# Patient Record
Sex: MALE | Race: WHITE | Employment: FULL TIME | URBAN - METROPOLITAN AREA
[De-identification: names, ages, dates, MRNs, and addresses within clinical notes are randomized per-mention and may not be internally consistent; named-entity substitution may affect disease eponyms.]

---

## 2017-06-27 RX ORDER — AMLODIPINE AND BENAZEPRIL HYDROCHLORIDE 5; 20 MG/1; MG/1
CAPSULE ORAL
Qty: 30 CAP | Refills: 2 | Status: SHIPPED | OUTPATIENT
Start: 2017-06-27

## 2018-09-11 ENCOUNTER — ANESTHESIA EVENT (OUTPATIENT)
Dept: ENDOSCOPY | Age: 58
End: 2018-09-11
Payer: COMMERCIAL

## 2018-09-11 NOTE — ANESTHESIA PREPROCEDURE EVALUATION
Anesthetic History No history of anesthetic complications Review of Systems / Medical History Patient summary reviewed, nursing notes reviewed and pertinent labs reviewed Pulmonary Smoker Neuro/Psych Within defined limits Cardiovascular Hypertension GI/Hepatic/Renal 
Within defined limits Endo/Other Within defined limits Comments: gout Other Findings Physical Exam 
 
Airway Mallampati: II 
TM Distance: > 6 cm Neck ROM: normal range of motion Mouth opening: Normal 
 
 Cardiovascular Regular rate and rhythm,  S1 and S2 normal,  no murmur, click, rub, or gallop Dental 
No notable dental hx Pulmonary Breath sounds clear to auscultation Abdominal 
GI exam deferred Other Findings Anesthetic Plan ASA: 2 Anesthesia type: MAC Induction: Intravenous Anesthetic plan and risks discussed with: Patient

## 2018-09-12 ENCOUNTER — ANESTHESIA (OUTPATIENT)
Dept: ENDOSCOPY | Age: 58
End: 2018-09-12
Payer: COMMERCIAL

## 2018-09-12 ENCOUNTER — HOSPITAL ENCOUNTER (OUTPATIENT)
Age: 58
Setting detail: OUTPATIENT SURGERY
Discharge: HOME | End: 2018-09-12
Attending: COLON & RECTAL SURGERY | Admitting: COLON & RECTAL SURGERY
Payer: COMMERCIAL

## 2018-09-12 VITALS
HEART RATE: 85 BPM | BODY MASS INDEX: 24.59 KG/M2 | DIASTOLIC BLOOD PRESSURE: 82 MMHG | OXYGEN SATURATION: 100 % | HEIGHT: 69 IN | RESPIRATION RATE: 19 BRPM | WEIGHT: 166 LBS | TEMPERATURE: 97.7 F | SYSTOLIC BLOOD PRESSURE: 144 MMHG

## 2018-09-12 PROCEDURE — 88305 TISSUE EXAM BY PATHOLOGIST: CPT | Performed by: COLON & RECTAL SURGERY

## 2018-09-12 PROCEDURE — 74011250636 HC RX REV CODE- 250/636

## 2018-09-12 PROCEDURE — 77030027957 HC TBNG IRR ENDOGTR BUSS -B: Performed by: COLON & RECTAL SURGERY

## 2018-09-12 PROCEDURE — 77030009426 HC FCPS BIOP ENDOSC BSC -B: Performed by: COLON & RECTAL SURGERY

## 2018-09-12 PROCEDURE — 77030013992 HC SNR POLYP ENDOSC BSC -B: Performed by: COLON & RECTAL SURGERY

## 2018-09-12 PROCEDURE — 76040000007: Performed by: COLON & RECTAL SURGERY

## 2018-09-12 PROCEDURE — 76060000032 HC ANESTHESIA 0.5 TO 1 HR: Performed by: COLON & RECTAL SURGERY

## 2018-09-12 RX ORDER — FLUMAZENIL 0.1 MG/ML
0.2 INJECTION INTRAVENOUS
Status: DISCONTINUED | OUTPATIENT
Start: 2018-09-12 | End: 2018-09-12 | Stop reason: HOSPADM

## 2018-09-12 RX ORDER — FENTANYL CITRATE 50 UG/ML
12.5-5 INJECTION, SOLUTION INTRAMUSCULAR; INTRAVENOUS
Status: DISCONTINUED | OUTPATIENT
Start: 2018-09-12 | End: 2018-09-12 | Stop reason: HOSPADM

## 2018-09-12 RX ORDER — NALOXONE HYDROCHLORIDE 0.4 MG/ML
0.4 INJECTION, SOLUTION INTRAMUSCULAR; INTRAVENOUS; SUBCUTANEOUS
Status: DISCONTINUED | OUTPATIENT
Start: 2018-09-12 | End: 2018-09-12 | Stop reason: HOSPADM

## 2018-09-12 RX ORDER — SODIUM CHLORIDE 0.9 % (FLUSH) 0.9 %
5-10 SYRINGE (ML) INJECTION AS NEEDED
Status: DISCONTINUED | OUTPATIENT
Start: 2018-09-12 | End: 2018-09-12 | Stop reason: HOSPADM

## 2018-09-12 RX ORDER — SODIUM CHLORIDE 9 MG/ML
100 INJECTION, SOLUTION INTRAVENOUS CONTINUOUS
Status: DISCONTINUED | OUTPATIENT
Start: 2018-09-12 | End: 2018-09-12 | Stop reason: HOSPADM

## 2018-09-12 RX ORDER — PROPOFOL 10 MG/ML
INJECTION, EMULSION INTRAVENOUS AS NEEDED
Status: DISCONTINUED | OUTPATIENT
Start: 2018-09-12 | End: 2018-09-12 | Stop reason: HOSPADM

## 2018-09-12 RX ORDER — LIDOCAINE HYDROCHLORIDE 20 MG/ML
INJECTION, SOLUTION EPIDURAL; INFILTRATION; INTRACAUDAL; PERINEURAL AS NEEDED
Status: DISCONTINUED | OUTPATIENT
Start: 2018-09-12 | End: 2018-09-12 | Stop reason: HOSPADM

## 2018-09-12 RX ORDER — DEXTROMETHORPHAN/PSEUDOEPHED 2.5-7.5/.8
1.2 DROPS ORAL
Status: DISCONTINUED | OUTPATIENT
Start: 2018-09-12 | End: 2018-09-12 | Stop reason: HOSPADM

## 2018-09-12 RX ORDER — MIDAZOLAM HYDROCHLORIDE 1 MG/ML
.25-5 INJECTION, SOLUTION INTRAMUSCULAR; INTRAVENOUS
Status: DISCONTINUED | OUTPATIENT
Start: 2018-09-12 | End: 2018-09-12 | Stop reason: HOSPADM

## 2018-09-12 RX ORDER — EPINEPHRINE 0.1 MG/ML
1 INJECTION INTRACARDIAC; INTRAVENOUS
Status: DISCONTINUED | OUTPATIENT
Start: 2018-09-12 | End: 2018-09-12 | Stop reason: HOSPADM

## 2018-09-12 RX ORDER — SODIUM CHLORIDE 0.9 % (FLUSH) 0.9 %
5-10 SYRINGE (ML) INJECTION EVERY 8 HOURS
Status: DISCONTINUED | OUTPATIENT
Start: 2018-09-12 | End: 2018-09-12 | Stop reason: HOSPADM

## 2018-09-12 RX ORDER — SODIUM CHLORIDE 9 MG/ML
INJECTION, SOLUTION INTRAVENOUS
Status: DISCONTINUED | OUTPATIENT
Start: 2018-09-12 | End: 2018-09-12 | Stop reason: HOSPADM

## 2018-09-12 RX ORDER — ATROPINE SULFATE 0.1 MG/ML
0.5 INJECTION INTRAVENOUS
Status: DISCONTINUED | OUTPATIENT
Start: 2018-09-12 | End: 2018-09-12 | Stop reason: HOSPADM

## 2018-09-12 RX ADMIN — PROPOFOL 50 MG: 10 INJECTION, EMULSION INTRAVENOUS at 09:37

## 2018-09-12 RX ADMIN — LIDOCAINE HYDROCHLORIDE 80 MG: 20 INJECTION, SOLUTION EPIDURAL; INFILTRATION; INTRACAUDAL; PERINEURAL at 09:14

## 2018-09-12 RX ADMIN — PROPOFOL 50 MG: 10 INJECTION, EMULSION INTRAVENOUS at 09:25

## 2018-09-12 RX ADMIN — PROPOFOL 50 MG: 10 INJECTION, EMULSION INTRAVENOUS at 09:14

## 2018-09-12 RX ADMIN — PROPOFOL 50 MG: 10 INJECTION, EMULSION INTRAVENOUS at 09:20

## 2018-09-12 RX ADMIN — PROPOFOL 50 MG: 10 INJECTION, EMULSION INTRAVENOUS at 09:29

## 2018-09-12 RX ADMIN — PROPOFOL 50 MG: 10 INJECTION, EMULSION INTRAVENOUS at 09:40

## 2018-09-12 RX ADMIN — PROPOFOL 50 MG: 10 INJECTION, EMULSION INTRAVENOUS at 09:23

## 2018-09-12 RX ADMIN — PROPOFOL 50 MG: 10 INJECTION, EMULSION INTRAVENOUS at 09:16

## 2018-09-12 RX ADMIN — SODIUM CHLORIDE: 9 INJECTION, SOLUTION INTRAVENOUS at 09:11

## 2018-09-12 RX ADMIN — PROPOFOL 50 MG: 10 INJECTION, EMULSION INTRAVENOUS at 09:34

## 2018-09-12 RX ADMIN — PROPOFOL 50 MG: 10 INJECTION, EMULSION INTRAVENOUS at 09:15

## 2018-09-12 NOTE — ANESTHESIA POSTPROCEDURE EVALUATION
Post-Anesthesia Evaluation and Assessment Patient: Kee Joshi MRN: [id number]  SSN: [id number] YOB: 1960  Age: 59 y.o. Sex: male Cardiovascular Function/Vital Signs Visit Vitals  /82  Pulse 85  Temp 36.5 °C (97.7 °F)  Resp 19  
 Ht 5' 9\" (1.753 m)  Wt 75.3 kg (166 lb)  SpO2 100%  BMI 24.51 kg/m2 Patient is status post MAC anesthesia for Procedure(s): 
COLONOSCOPY 
ENDOSCOPIC POLYPECTOMY. Nausea/Vomiting: None Postoperative hydration reviewed and adequate. Pain: 
Pain Scale 1: Numeric (0 - 10) (09/12/18 1009) Pain Intensity 1: 0 (09/12/18 1009) Managed Neurological Status: At baseline Mental Status and Level of Consciousness: Arousable Pulmonary Status:  
O2 Device: Room air (09/12/18 1009) Adequate oxygenation and airway patent Complications related to anesthesia: None Post-anesthesia assessment completed. No concerns Signed By: Mariana Rabago MD   
 September 12, 2018

## 2018-09-12 NOTE — H&P
History and Physical (outpatient) Patient: Sushila Leonard 59 y.o. male Chief Complaint: Need for colorectal cancer screening. History of Present Illness: The patient is an asymptomatic 77-year-old male who had one adenomatous polyp excised colonoscopically on 10/24/2008 and two tubular adenomas excised colonoscopically on 10/4/2013. History: 
Past Medical History:  
Diagnosis Date  Diverticulosis of colon  Gout  HDL lipoprotein deficiency  Hyperglycemia  Hypertension  Personal history of colonic polyps One diminutive adenoma was excised colonoscopically on 10/24/2008. Two tubular adenomas were excised colonoscopically on 10/4/2013. Past Surgical History:  
Procedure Laterality Date  HX COLONOSCOPY  10/04/2013 Diverticulosis. At least two tubular adenomas removed. Dr. Mortimer Capes.  HX COLONOSCOPY  10/24/2008 Diverticulosis. Adenomatous polyp removed. Dr. Mortimer Capes. [facility]; Neil Leblanc MD.  
[place] Pa HX TONSILLECTOMY  HX WISDOM TEETH EXTRACTION Family History Problem Relation Age of Onset  Cancer Mother  Cancer Father Social History Social History  Marital status: SINGLE Spouse name: N/A  
 Number of children: N/A  
 Years of education: N/A Occupational History  Not on file. Social History Main Topics  Smoking status: Never Smoker  Smokeless tobacco: Never Used  Alcohol use 0.0 oz/week  
  0 Standard drinks or equivalent per week  Drug use: No  
 Sexual activity: Yes  
  Partners: Female Other Topics Concern  Not on file Social History Narrative Allergies: No Known Allergies Current Medications: 
Cannot display prior to admission medications because the patient has not been admitted in this contact. No current facility-administered medications for this encounter. Current Outpatient Prescriptions Medication Sig  
  ASPIRIN PO Take 81 mg by mouth daily.  amLODIPine-benazepril (LOTREL) 5-20 mg per capsule TAKE 1 CAPSULE BY MOUTH DAILY.  sildenafil (REVATIO) 20 mg tablet TAKE 3 TO 5 TABLETS AS NEEDED  cyclobenzaprine (FLEXERIL) 10 mg tablet Take 1 Tab by mouth three (3) times daily as needed for Muscle Spasm(s).  OMEGA-3 FATTY ACIDS/FISH OIL (OMEGA 3 FISH OIL PO) Take  by mouth.  KRILL OIL PO Take  by mouth.  varenicline (CHANTIX STARTER KAIDEN) Per Dose pack instructions  colchicine 0.6 mg tablet Take 1 Tab by mouth two (2) times daily as needed.  indomethacin (INDOCIN) 50 mg capsule Take 1 Cap by mouth three (3) times daily as needed.  amLODIPine-benazepril (LOTREL) 5-20 mg per capsule TAKE 1 CAPSULE BY MOUTH DAILY.  cranberry 400 mg cap Take  by mouth.  aspirin 81 mg chewable tablet Take 81 mg by mouth daily.  LACTOBACILLUS ACIDOPHILUS (PROBIOTIC PO) Take  by mouth.  Cholecalciferol, Vitamin D3, (VITAMIN D3) 1,000 unit Cap Take  by mouth.  omega-3 fatty acids Cap Take  by mouth.  PV W-O AMIRA/FERROUS FUMARATE/FA (M-VIT PO) Take  by mouth. Physical Exam: 
There were no vitals taken for this visit. GENERAL:  No apparent distress. LUNGS:  Clear to auscultation bilaterally. HEART:  Regular rate and rhythm with no murmurs, gallops, or rubs. ABDOMEN: Soft, non-tender, and non-distended. NEUROLOGIC: Alert and oriented. No gross deficits. Alerts:   
Hospital Problems  Date Reviewed: 9/2/2016 None Laboratory:    No results for input(s): HGB, HCT, WBC, PLT, INR, BUN, CREA, K, CRCLT, HGBEXT, HCTEXT, PLTEXT in the last 72 hours. No lab exists for component: PTT, PT, INREXT Assessment:  Another colonoscopy is indicated for colorectal cancer screening/post-polypectomy surveillance. Plan:  The risks, benefits, and alternatives were reviewed with the patient, and he has agreed to proceed with the procedure. The plan for this patient includes MAC.

## 2018-09-12 NOTE — PROCEDURES
Mirella Reyes  [id number]  1960    Colonoscopy Operative Report    Procedure Type:   Colonoscopy with hot forceps polypectomies. Pre-operative Diagnosis:  Need for colorectal cancer screening. History of colonic polyps. Post-operative Diagnosis:  Colonic and rectal polyps. Diverticulosis. Surgeon:  Shawn Francisco MD    Referring Provider: Andrew Mays MD    Sedation and Analgesia:  MAC anesthesia Propofol (500 mg)    Specimens Removed:  Polyps (3). EBL:  0 mL. Complications: None apparent. Indication For Procedure: The patient is an asymptomatic 67-year-old male who had one adenomatous polyp excised colonoscopically on 10/24/2008 and two tubular adenomas excised colonoscopically on 10/4/2013. Another colonoscopy is indicated for colorectal cancer screening/post-polypectomy surveillance. Findings: There were two diminutive polyps located in the ascending colon and there was one located in the distal rectum  There was moderate pancolonic diverticulosis. Procedure Details:  After informed consent was obtained, the patient was taken to the endoscopy suite where standard monitoring devices were attached and intravenous access was established. Sedation was administered by the anesthetist as needed throughout the procedure. The patient was placed in the left lateral decubitus position, and inspection of the perineum revealed no significant external lesions. Digital rectal examination revealed no masses. The Olympus videocolonoscope was lubricated and inserted transanally into the rectum. The rectal polyp noted above was excised using the hot forceps, and hemostasis was excellent. The scope was then advanced into the colon and without difficulty to the cecum, which was identified by the presence of the ileocecal valve and the appendiceal orifice. The quality of the bowel preparation was good, as was the overall visualization.   Careful inspection was performed during withdrawal of the colonoscope. The colonic polyps noted above were each removed using the hot forceps with excellent hemostasis. There were no apparent complications, and the patient appeared to have tolerated the procedure well. At its conclusion, he was transported to the recovery area in good condition. Impression:    The polyps appeared to have been benign. Recommendations: The patient should consume a high fiber diet. I will contact him with the pathology results and make a follow-up recommendation at that time.

## 2018-09-12 NOTE — PERIOP NOTES

## 2018-09-12 NOTE — DISCHARGE INSTRUCTIONS
High-Fiber Diet: Care Instructions  Your Care Instructions    A high-fiber diet may help you relieve constipation and feel less bloated. Your doctor and dietitian will help you make a high-fiber eating plan based on your personal needs. The plan will include the things you like to eat. It will also make sure that you get 30 grams of fiber a day. Before you make changes to the way you eat, be sure to talk with your doctor or dietitian. Follow-up care is a key part of your treatment and safety. Be sure to make and go to all appointments, and call your doctor if you are having problems. It's also a good idea to know your test results and keep a list of the medicines you take. How can you care for yourself at home? · You can increase how much fiber you get if you eat more of certain foods. These foods include:  ¨ Whole-grain breads and cereals. ¨ Fruits, such as pears, apples, and peaches. Eat the skins, peels, and seeds, if you can. ¨ Vegetables, such as broccoli, cabbage, spinach, carrots, asparagus, and squash. ¨ Starchy vegetables. These include potatoes with skins, kidney beans, and lima beans. · Take a fiber supplement every day if your doctor recommends it. Examples are Benefiber, Citrucel, FiberCon, and Metamucil. Ask your doctor how much to take. · Drink plenty of fluids, enough so that your urine is light yellow or clear like water. If you have kidney, heart, or liver disease and have to limit fluids, talk with your doctor before you increase the amount of fluids you drink. · Get some exercise every day. Exercise helps stool move through the colon. It also helps prevent constipation. · Keep a food diary. Try to notice and write down what foods cause gas, pain, or other symptoms. Then you can avoid these foods. Where can you learn more? Go to http://elie-sharan.info/. Enter Z756 in the search box to learn more about \"High-Fiber Diet: Care Instructions. \"  Current as of: May 12, 2017  Content Version: 11.7  © 3731-9347 [employer] Care instructions adapted under license by [employer] (which disclaims liability or warranty for this information). If you have questions about a medical condition or this instruction, always ask your healthcare professional. [address] any warranty or liability for your use of this information. Colon Polyps: Care Instructions  Your Care Instructions    Colon polyps are growths in the colon or the rectum. The cause of most colon polyps is not known, and most people who get them do not have any problems. But a certain kind can turn into cancer. For this reason, regular testing for colon polyps is important for people age 48 and older and anyone who has an increased risk for colon cancer. Polyps are usually found through routine colon cancer screening tests. Although most colon polyps are not cancerous, they are usually removed and then tested for cancer. Screening for colon cancer saves lives because the cancer can usually be cured if it is caught early. If you have a polyp that is the type that can turn into cancer, you may need more tests to examine your entire colon. The doctor will remove any other polyps that he or she finds, and you will be tested more often. Follow-up care is a key part of your treatment and safety. Be sure to make and go to all appointments, and call your doctor if you are having problems. It's also a good idea to know your test results and keep a list of the medicines you take. How can you care for yourself at home? Regular exams to look for colon polyps are the best way to prevent polyps from turning into colon cancer. These can include stool tests, sigmoidoscopy, colonoscopy, and CT colonography. Talk with your doctor about a testing schedule that is right for you. To prevent polyps  There is no home treatment that can prevent colon polyps.  But these steps may help lower your risk for cancer. · Stay active. Being active can help you get to and stay at a healthy weight. Try to exercise on most days of the week. Walking is a good choice. · Eat well. Choose a variety of vegetables, fruits, legumes (such as peas and beans), fish, poultry, and whole grains. · Do not smoke. If you need help quitting, talk to your doctor about stop-smoking programs and medicines. These can increase your chances of quitting for good. · If you drink alcohol, limit how much you drink. Limit alcohol to 2 drinks a day for men and 1 drink a day for women. When should you call for help? Call your doctor now or seek immediate medical care if:    · You have severe belly pain.     · Your stools are maroon or very bloody.    Watch closely for changes in your health, and be sure to contact your doctor if:    · You have a fever.     · You have nausea or vomiting.     · You have a change in bowel habits (new constipation or diarrhea).     · Your symptoms get worse or are not improving as expected. Where can you learn more? Go to http://elie-sharan.info/. Enter 95 [id number] in the search box to learn more about \"Colon Polyps: Care Instructions. \"  Current as of: May 12, 2017  Content Version: 11.7  © 9571-1819 [employer]. Care instructions adapted under license by [employer] (which disclaims liability or warranty for this information). If you have questions about a medical condition or this instruction, always ask your healthcare professional. [place] [address] any warranty or liability for your use of this information. Diverticulosis: Care Instructions  Your Care Instructions  In diverticulosis, pouches called diverticula form in the wall of the large intestine (colon). The pouches do not cause any pain or other symptoms. Most people who have diverticulosis do not know they have it.  But the pouches sometimes bleed, and if they become infected, they can cause pain and other symptoms. When this happens, it is called diverticulitis. Diverticula form when pressure pushes the wall of the colon outward at certain weak points. A diet that is too low in fiber can cause diverticula. Follow-up care is a key part of your treatment and safety. Be sure to make and go to all appointments, and call your doctor if you are having problems. It's also a good idea to know your test results and keep a list of the medicines you take. How can you care for yourself at home? · Include fruits, leafy green vegetables, beans, and whole grains in your diet each day. These foods are high in fiber. · Take a fiber supplement, such as Citrucel or Metamucil, every day if needed. Read and follow all instructions on the label. · Drink plenty of fluids, enough so that your urine is light yellow or clear like water. If you have kidney, heart, or liver disease and have to limit fluids, talk with your doctor before you increase the amount of fluids you drink. · Get at least 30 minutes of exercise on most days of the week. Walking is a good choice. You also may want to do other activities, such as running, swimming, cycling, or playing tennis or team sports. · Cut out foods that cause gas, pain, or other symptoms. When should you call for help?   Call your doctor now or seek immediate medical care if:    · You have belly pain.     · You pass maroon or very bloody stools.     · You have a fever.     · You have nausea and vomiting.     · You have unusual changes in your bowel movements or abdominal swelling.     · You have burning pain when you urinate.     · You have abnormal vaginal discharge.     · You have shoulder pain.     · You have cramping pain that does not get better when you have a bowel movement or pass gas.     · You pass gas or stool from your urethra while urinating.    Watch closely for changes in your health, and be sure to contact your doctor if you have any problems. Where can you learn more? Go to http://elie-sharan.info/. Enter U819 in the search box to learn more about \"Diverticulosis: Care Instructions. \"  Current as of: May 12, 2017  Content Version: 11.7  © 5765-1096 [employer]. Care instructions adapted under license by [employer] (which disclaims liability or warranty for this information). If you have questions about a medical condition or this instruction, always ask your healthcare professional. [place] [address] any warranty or liability for your use of this information. Wyjeffry Wilfredodione, [address] [facility],[place] [address]., [address] [facility], [address] [place] [address]  [phone]      Post-Polypectomy Instructions    1. Do not drive, operate dangerous machinery, sign legal documents, or conduct any important business for the rest of the day. 2. Avoid strenuous exercise for the next 10 days. 3. Avoid straining when you move your bowels. 4. Do not take any aspirin, aspirin-containing products, ibuprofen (Advil, Motrin, etc.), Aleve, or krill oil for the next two weeks. Do not take indomethacin for the next week. You may take Tylenol as directed on the bottle if needed. 5. You may begin with a light diet today then advance to your usual diet as tolerated. Do not drink alcohol for the rest of the day. 6. If you notice blood in your stool for more than one or two bowel movements following the procedure, if you develop abdominal pain (aside from gas cramps on the day of the procedure), or if you develop a fever with a temperature of 101.0 or higher, call my office. 7. If you do not have a bowel movement within the next two days, call my office. 8. If you have any other problems or questions, please call my office. 9. Findings and Follow-up:  There was no cancer. There were three small benign-appearing polyps that we removed, and the diverticulosis was of course still present.   You should consume a high fiber diet. I will discuss the pathology results with you when they are available, and I will make a follow-up recommendation at that time. Please call my office if you have not heard from me by Monday 9/17/2018.

## 2018-09-12 NOTE — IP AVS SNAPSHOT
[address] 
[phone] Patient: Pk Mercer MRN: [id number] PEO:[id number] About your hospitalization You were admitted on:  September 12, 2018 You last received care in the:  [facility] ENDOSCOPY You were discharged on:  September 12, 2018 Why you were hospitalized Your primary diagnosis was:  Not on File Follow-up Information Follow up With Details Comments Contact Info Neha Waldron MD   [facility] [address] [facility] 
[phone] Discharge Orders None A check edward indicates which time of day the medication should be taken. My Medications CONTINUE taking these medications Instructions Each Dose to Equal  
 Morning Noon Evening Bedtime * amLODIPine-benazepril 5-20 mg per capsule Commonly known as:  Nick Rizzo Your last dose was: Your next dose is: TAKE 1 CAPSULE BY MOUTH DAILY. * amLODIPine-benazepril 5-20 mg per capsule Commonly known as:  Nick Rizzo Your last dose was: Your next dose is: TAKE 1 CAPSULE BY MOUTH DAILY. colchicine 0.6 mg tablet Your last dose was: Your next dose is: Take 1 Tab by mouth two (2) times daily as needed. 0.6 mg  
    
   
   
   
  
 cranberry 400 mg Cap Your last dose was: Your next dose is: Take  by mouth. cyclobenzaprine 10 mg tablet Commonly known as:  FLEXERIL Your last dose was: Your next dose is: Take 1 Tab by mouth three (3) times daily as needed for Muscle Spasm(s). 10 mg  
    
   
   
   
  
 M-VIT PO Your last dose was: Your next dose is: Take  by mouth. omega-3 fatty acids Cap Your last dose was: Your next dose is: Take  by mouth. PROBIOTIC PO Your last dose was: Your next dose is: Take  by mouth.  
     
   
   
   
  
 sildenafil (antihypertensive) 20 mg tablet Commonly known as:  REVATIO Your last dose was: Your next dose is: TAKE 3 TO 5 TABLETS AS NEEDED  
     
   
   
   
  
 varenicline 0.5 mg (11)- 1 mg (42) Dspk Commonly known as:  CHANTIX STARTER KAIDEN Your last dose was: Your next dose is:    
   
   
 Per Dose pack instructions VITAMIN D3 1,000 unit Cap Generic drug:  cholecalciferol Your last dose was: Your next dose is: Take  by mouth. * Notice: This list has 2 medication(s) that are the same as other medications prescribed for you. Read the directions carefully, and ask your doctor or other care provider to review them with you. STOP taking these medications   
 aspirin 81 mg chewable tablet ASPIRIN PO  
   
  
 indomethacin 50 mg capsule Commonly known as:  INDOCIN  
   
  
 KRILL OIL PO  
   
  
 OMEGA 3 FISH OIL PO Discharge Instructions High-Fiber Diet: Care Instructions Your Care Instructions A high-fiber diet may help you relieve constipation and feel less bloated. Your doctor and dietitian will help you make a high-fiber eating plan based on your personal needs. The plan will include the things you like to eat. It will also make sure that you get 30 grams of fiber a day. Before you make changes to the way you eat, be sure to talk with your doctor or dietitian. Follow-up care is a key part of your treatment and safety. Be sure to make and go to all appointments, and call your doctor if you are having problems. It's also a good idea to know your test results and keep a list of the medicines you take. How can you care for yourself at home? · You can increase how much fiber you get if you eat more of certain foods. These foods include: ¨ Whole-grain breads and cereals. ¨ Fruits, such as pears, apples, and peaches. Eat the skins, peels, and seeds, if you can. ¨ Vegetables, such as broccoli, cabbage, spinach, carrots, asparagus, and squash. ¨ Starchy vegetables. These include potatoes with skins, kidney beans, and lima beans. · Take a fiber supplement every day if your doctor recommends it. Examples are Benefiber, Citrucel, FiberCon, and Metamucil. Ask your doctor how much to take. · Drink plenty of fluids, enough so that your urine is light yellow or clear like water. If you have kidney, heart, or liver disease and have to limit fluids, talk with your doctor before you increase the amount of fluids you drink. · Get some exercise every day. Exercise helps stool move through the colon. It also helps prevent constipation. · Keep a food diary. Try to notice and write down what foods cause gas, pain, or other symptoms. Then you can avoid these foods. Where can you learn more? Go to http://elie-sharan.info/. Enter C752 in the search box to learn more about \"High-Fiber Diet: Care Instructions. \" Current as of: May 12, 2017 Content Version: 11.7 © 7911-6351 EXENDIS. Care instructions adapted under license by Pressmart (which disclaims liability or warranty for this information). If you have questions about a medical condition or this instruction, always ask your healthcare professional. [place] [address] any warranty or liability for your use of this information. Colon Polyps: Care Instructions Your Care Instructions Colon polyps are growths in the colon or the rectum. The cause of most colon polyps is not known, and most people who get them do not have any problems. But a certain kind can turn into cancer.  For this reason, regular testing for colon polyps is important for people age 48 and older and anyone who has an increased risk for colon cancer. Polyps are usually found through routine colon cancer screening tests. Although most colon polyps are not cancerous, they are usually removed and then tested for cancer. Screening for colon cancer saves lives because the cancer can usually be cured if it is caught early. If you have a polyp that is the type that can turn into cancer, you may need more tests to examine your entire colon. The doctor will remove any other polyps that he or she finds, and you will be tested more often. Follow-up care is a key part of your treatment and safety. Be sure to make and go to all appointments, and call your doctor if you are having problems. It's also a good idea to know your test results and keep a list of the medicines you take. How can you care for yourself at home? Regular exams to look for colon polyps are the best way to prevent polyps from turning into colon cancer. These can include stool tests, sigmoidoscopy, colonoscopy, and CT colonography. Talk with your doctor about a testing schedule that is right for you. To prevent polyps There is no home treatment that can prevent colon polyps. But these steps may help lower your risk for cancer. · Stay active. Being active can help you get to and stay at a healthy weight. Try to exercise on most days of the week. Walking is a good choice. · Eat well. Choose a variety of vegetables, fruits, legumes (such as peas and beans), fish, poultry, and whole grains. · Do not smoke. If you need help quitting, talk to your doctor about stop-smoking programs and medicines. These can increase your chances of quitting for good. · If you drink alcohol, limit how much you drink. Limit alcohol to 2 drinks a day for men and 1 drink a day for women. When should you call for help? Call your doctor now or seek immediate medical care if:   · You have severe belly pain.  
  · Your stools are maroon or very bloody.  
 Watch closely for changes in your health, and be sure to contact your doctor if: 
  · You have a fever.  
  · You have nausea or vomiting.  
  · You have a change in bowel habits (new constipation or diarrhea).  
  · Your symptoms get worse or are not improving as expected. Where can you learn more? Go to http://elie-sharan.info/. Enter 95 [id number] in the search box to learn more about \"Colon Polyps: Care Instructions. \" Current as of: May 12, 2017 Content Version: 11.7 © 0691-7709 Signostics. Care instructions adapted under license by Voxxter (which disclaims liability or warranty for this information). If you have questions about a medical condition or this instruction, always ask your healthcare professional. [address] any warranty or liability for your use of this information. Diverticulosis: Care Instructions Your Care Instructions In diverticulosis, pouches called diverticula form in the wall of the large intestine (colon). The pouches do not cause any pain or other symptoms. Most people who have diverticulosis do not know they have it. But the pouches sometimes bleed, and if they become infected, they can cause pain and other symptoms. When this happens, it is called diverticulitis. Diverticula form when pressure pushes the wall of the colon outward at certain weak points. A diet that is too low in fiber can cause diverticula. Follow-up care is a key part of your treatment and safety. Be sure to make and go to all appointments, and call your doctor if you are having problems. It's also a good idea to know your test results and keep a list of the medicines you take. How can you care for yourself at home? · Include fruits, leafy green vegetables, beans, and whole grains in your diet each day. These foods are high in fiber. · Take a fiber supplement, such as Citrucel or Metamucil, every day if needed. Read and follow all instructions on the label. · Drink plenty of fluids, enough so that your urine is light yellow or clear like water. If you have kidney, heart, or liver disease and have to limit fluids, talk with your doctor before you increase the amount of fluids you drink. · Get at least 30 minutes of exercise on most days of the week. Walking is a good choice. You also may want to do other activities, such as running, swimming, cycling, or playing tennis or team sports. · Cut out foods that cause gas, pain, or other symptoms. When should you call for help? Call your doctor now or seek immediate medical care if: 
  · You have belly pain.  
  · You pass maroon or very bloody stools.  
  · You have a fever.  
  · You have nausea and vomiting.  
  · You have unusual changes in your bowel movements or abdominal swelling.  
  · You have burning pain when you urinate.  
  · You have abnormal vaginal discharge.  
  · You have shoulder pain.  
  · You have cramping pain that does not get better when you have a bowel movement or pass gas.  
  · You pass gas or stool from your urethra while urinating.  
 Watch closely for changes in your health, and be sure to contact your doctor if you have any problems. Where can you learn more? Go to http://elie-sharan.info/. Enter W135 in the search box to learn more about \"Diverticulosis: Care Instructions. \" Current as of: May 12, 2017 Content Version: 11.7 © 6296-0753 InOpen. Care instructions adapted under license by DealCloud (which disclaims liability or warranty for this information). If you have questions about a medical condition or this instruction, always ask your healthcare professional. [place] [address] any warranty or liability for your use of this information.  
JOSE RAUL Jesus. 
 [place], [address] [facility] 
[phone] Post-Polypectomy Instructions 1. Do not drive, operate dangerous machinery, sign legal documents, or conduct any important business for the rest of the day. 2. Avoid strenuous exercise for the next 10 days. 3. Avoid straining when you move your bowels. 4. Do not take any aspirin, aspirin-containing products, ibuprofen (Advil, Motrin, etc.), Aleve, or krill oil for the next two weeks. Do not take indomethacin for the next week. You may take Tylenol as directed on the bottle if needed. 5. You may begin with a light diet today then advance to your usual diet as tolerated. Do not drink alcohol for the rest of the day. 6. If you notice blood in your stool for more than one or two bowel movements following the procedure, if you develop abdominal pain (aside from gas cramps on the day of the procedure), or if you develop a fever with a temperature of 101.0 or higher, call my office. 7. If you do not have a bowel movement within the next two days, call my office. 8. If you have any other problems or questions, please call my office. 9. Findings and Follow-up:  There was no cancer. There were three small benign-appearing polyps that we removed, and the diverticulosis was of course still present. You should consume a high fiber diet. I will discuss the pathology results with you when they are available, and I will make a follow-up recommendation at that time. Please call my office if you have not heard from me by Monday 9/17/2018. Introducing [facility]! Dear Shayne Bravo: Thank you for requesting a [email or web address] account. Our records indicate that you already have an active [email or web address] account. You can access your account anytime at [email or web address] Did you know that you can access your hospital and ER discharge instructions at any time in [email or web address]?   You can also review all of your test results from your hospital stay or ER visit. Additional Information If you have questions, please visit the Frequently Asked Questions section of the MyChart website at [email or web address] Remember, GIROPTICt is NOT to be used for urgent needs. For medical emergencies, dial 911. Now available from your iPhone and Android! Introducing Fabian Collier As a [employer] patient, I wanted to make you aware of our electronic visit tool called Fabian Collier. [employer] 24/7 allows you to connect within minutes with a medical provider 24 hours a day, seven days a week via a mobile device or tablet or logging into a secure website from your computer. You can access Fabian Collier from anywhere in the [place]. A virtual visit might be right for you when you have a simple condition and feel like you just dont want to get out of bed, or cant get away from work for an appointment, when your regular [employer] provider is not available (evenings, weekends or holidays), or when youre out of town and need minor care. Electronic visits cost only $49 and if the [employer] 24/7 provider determines a prescription is needed to treat your condition, one can be electronically transmitted to a nearby pharmacy*. Please take a moment to enroll today if you have not already done so. The enrollment process is free and takes just a few minutes. To enroll, please download the [employer] 24/7 jasmine to your tablet or phone, or visit [email or web address] to enroll on your computer. And, as an [address] [place] [address] patient with a 5minutes account, the results of your visits will be scanned into your electronic medical record and your primary care provider will be able to view the scanned results. We urge you to continue to see your regular [employer] provider for your ongoing medical care.   And while your primary care provider may not be the one available when you seek a Fabian Collier virtual visit, the peace of mind you get from getting a real diagnosis real time can be priceless. For more information on Fabian Collier, view our Frequently Asked Questions (FAQs) at [email or web address]. Sincerely, 
 
Valentino Milks, MD 
Chief Medical Officer Danielle Santos *:  certain medications cannot be prescribed via Fabian Collier Providers Seen During Your Hospitalization Provider Specialty Primary office phone Celsa Rome MD Colon and Rectal Surgery [phone] Your Primary Care Physician (PCP) Primary Care Physician Office Phone Office Fax Rodolfo Hawthorne [phone] You are allergic to the following No active allergies Recent Documentation Height Weight BMI Smoking Status 1.753 m 75.3 kg 24.51 kg/m2 Never Smoker Emergency Contacts Name Discharge Info Relation Home Work Mobile [address] CAREGIVER [3] Other Relative [6] [phone] Patient Belongings The following personal items are in your possession at time of discharge: 
  Dental Appliances: None  Visual Aid: None Please provide this summary of care documentation to your next provider. Signatures-by signing, you are acknowledging that this After Visit Summary has been reviewed with you and you have received a copy. Patient Signature:  ____________________________________________________________ Date:  ____________________________________________________________  
  
Wayne Ortega Provider Signature:  ____________________________________________________________ Date:  ____________________________________________________________

## 2018-09-12 NOTE — ROUTINE PROCESS
Shelbie Power 1960 
[id number] Situation: 
Verbal report received from: [facility] RN Procedure: Procedure(s): 
COLONOSCOPY 
ENDOSCOPIC POLYPECTOMY Background: 
 
Preoperative diagnosis: SCREENING, HX OF COLONIC POLYPS Postoperative diagnosis: 1. Distal Rectal Polyp 2. Ascending Colon Polyps x 2 3. Diverticulosis :  Dr. Whitney Sofia Assistant(s): Endoscopy Technician-1: Jonatan Millan IV Endoscopy RN-1: Elvia Szymanski Specimens:  
ID Type Source Tests Collected by Time Destination 1 : distal rectal polyp Preservative Rectum  Sam Bustamante MD 9/12/2018 0684 Pathology 2 : Ascending colon polyps x 2 Preservative Colon, Ascending  Sam Bustamante MD 9/12/2018 0930 Pathology H. Pylori  no Assessment: 
Intra-procedure medications Anesthesia gave intra-procedure sedation and medications, see anesthesia flow sheet yes Intravenous fluids: NS@ [place] Vital signs stable Abdominal assessment: round and soft Recommendation: 
Discharge patient per MD order. Family or Friend Permission to share finding with family or friend yes

## 2023-07-12 ENCOUNTER — ANESTHESIA (OUTPATIENT)
Facility: HOSPITAL | Age: 63
End: 2023-07-12
Payer: COMMERCIAL

## 2023-07-12 ENCOUNTER — HOSPITAL ENCOUNTER (OUTPATIENT)
Facility: HOSPITAL | Age: 63
Setting detail: OUTPATIENT SURGERY
Discharge: HOME | End: 2023-07-12
Attending: COLON & RECTAL SURGERY | Admitting: COLON & RECTAL SURGERY
Payer: COMMERCIAL

## 2023-07-12 ENCOUNTER — ANESTHESIA EVENT (OUTPATIENT)
Facility: HOSPITAL | Age: 63
End: 2023-07-12
Payer: COMMERCIAL

## 2023-07-12 VITALS
TEMPERATURE: 98 F | HEART RATE: 63 BPM | SYSTOLIC BLOOD PRESSURE: 122 MMHG | WEIGHT: 180 LBS | OXYGEN SATURATION: 96 % | DIASTOLIC BLOOD PRESSURE: 77 MMHG | RESPIRATION RATE: 16 BRPM | BODY MASS INDEX: 26.66 KG/M2 | HEIGHT: 69 IN

## 2023-07-12 PROCEDURE — 2580000003 HC RX 258: Performed by: COLON & RECTAL SURGERY

## 2023-07-12 PROCEDURE — 6360000002 HC RX W HCPCS: Performed by: NURSE ANESTHETIST, CERTIFIED REGISTERED

## 2023-07-12 PROCEDURE — 3700000000 HC ANESTHESIA ATTENDED CARE: Performed by: COLON & RECTAL SURGERY

## 2023-07-12 PROCEDURE — 3600007512: Performed by: COLON & RECTAL SURGERY

## 2023-07-12 PROCEDURE — 88305 TISSUE EXAM BY PATHOLOGIST: CPT

## 2023-07-12 PROCEDURE — 7100000011 HC PHASE II RECOVERY - ADDTL 15 MIN: Performed by: COLON & RECTAL SURGERY

## 2023-07-12 PROCEDURE — 2500000003 HC RX 250 WO HCPCS: Performed by: NURSE ANESTHETIST, CERTIFIED REGISTERED

## 2023-07-12 PROCEDURE — 3700000001 HC ADD 15 MINUTES (ANESTHESIA): Performed by: COLON & RECTAL SURGERY

## 2023-07-12 PROCEDURE — 2709999900 HC NON-CHARGEABLE SUPPLY: Performed by: COLON & RECTAL SURGERY

## 2023-07-12 PROCEDURE — 3600007502: Performed by: COLON & RECTAL SURGERY

## 2023-07-12 PROCEDURE — 7100000010 HC PHASE II RECOVERY - FIRST 15 MIN: Performed by: COLON & RECTAL SURGERY

## 2023-07-12 RX ORDER — LIDOCAINE HYDROCHLORIDE 20 MG/ML
INJECTION, SOLUTION EPIDURAL; INFILTRATION; INTRACAUDAL; PERINEURAL PRN
Status: DISCONTINUED | OUTPATIENT
Start: 2023-07-12 | End: 2023-07-12 | Stop reason: SDUPTHER

## 2023-07-12 RX ORDER — ASPIRIN 81 MG/1
81 TABLET ORAL DAILY
COMMUNITY

## 2023-07-12 RX ORDER — SODIUM CHLORIDE 0.9 % (FLUSH) 0.9 %
5-40 SYRINGE (ML) INJECTION EVERY 12 HOURS SCHEDULED
Status: DISCONTINUED | OUTPATIENT
Start: 2023-07-12 | End: 2023-07-12 | Stop reason: HOSPADM

## 2023-07-12 RX ORDER — ALLOPURINOL 300 MG/1
TABLET ORAL
COMMUNITY
Start: 2023-05-21

## 2023-07-12 RX ORDER — SIROLIMUS 2 MG/1
TABLET, FILM COATED ORAL
COMMUNITY
Start: 2023-05-21

## 2023-07-12 RX ORDER — SODIUM CHLORIDE 0.9 % (FLUSH) 0.9 %
5-40 SYRINGE (ML) INJECTION PRN
Status: DISCONTINUED | OUTPATIENT
Start: 2023-07-12 | End: 2023-07-12 | Stop reason: HOSPADM

## 2023-07-12 RX ORDER — CLOPIDOGREL BISULFATE 75 MG/1
TABLET ORAL
Status: ON HOLD | COMMUNITY
Start: 2023-04-13 | End: 2023-07-12

## 2023-07-12 RX ORDER — SODIUM CHLORIDE 9 MG/ML
INJECTION, SOLUTION INTRAVENOUS CONTINUOUS
Status: DISCONTINUED | OUTPATIENT
Start: 2023-07-12 | End: 2023-07-12 | Stop reason: HOSPADM

## 2023-07-12 RX ORDER — BENAZEPRIL HYDROCHLORIDE 20 MG/1
20 TABLET ORAL DAILY
COMMUNITY

## 2023-07-12 RX ORDER — ROSUVASTATIN CALCIUM 20 MG/1
TABLET, COATED ORAL
COMMUNITY
Start: 2023-07-08

## 2023-07-12 RX ORDER — SODIUM CHLORIDE 9 MG/ML
25 INJECTION, SOLUTION INTRAVENOUS PRN
Status: DISCONTINUED | OUTPATIENT
Start: 2023-07-12 | End: 2023-07-12 | Stop reason: HOSPADM

## 2023-07-12 RX ADMIN — PROPOFOL 25 MG: 10 INJECTION, EMULSION INTRAVENOUS at 10:32

## 2023-07-12 RX ADMIN — LIDOCAINE HYDROCHLORIDE 25 MG: 20 INJECTION, SOLUTION EPIDURAL; INFILTRATION; INTRACAUDAL; PERINEURAL at 10:21

## 2023-07-12 RX ADMIN — PROPOFOL 25 MG: 10 INJECTION, EMULSION INTRAVENOUS at 10:29

## 2023-07-12 RX ADMIN — SODIUM CHLORIDE: 9 INJECTION, SOLUTION INTRAVENOUS at 10:17

## 2023-07-12 RX ADMIN — PROPOFOL 80 MG: 10 INJECTION, EMULSION INTRAVENOUS at 10:19

## 2023-07-12 RX ADMIN — PROPOFOL 25 MG: 10 INJECTION, EMULSION INTRAVENOUS at 10:35

## 2023-07-12 RX ADMIN — PROPOFOL 40 MG: 10 INJECTION, EMULSION INTRAVENOUS at 10:21

## 2023-07-12 RX ADMIN — PROPOFOL 40 MG: 10 INJECTION, EMULSION INTRAVENOUS at 10:23

## 2023-07-12 RX ADMIN — PROPOFOL 40 MG: 10 INJECTION, EMULSION INTRAVENOUS at 10:25

## 2023-07-12 RX ADMIN — PROPOFOL 25 MG: 10 INJECTION, EMULSION INTRAVENOUS at 10:39

## 2023-07-12 ASSESSMENT — PAIN SCALES - GENERAL: PAINLEVEL_OUTOF10: 0

## 2023-07-12 ASSESSMENT — LIFESTYLE VARIABLES: SMOKING_STATUS: 1

## 2023-07-12 NOTE — H&P
History and Physical (outpatient)    Patient: Mick Atwood 57 y.o. male     Chief Complaint: Need for colorectal cancer screening/post-polypectomy surveillance    History of Present Illness: The patient is an asymptomatic 57-year-old male with a history of colonic polyps. One adenomatous polyp was excised colonoscopically on 10/24/2008, and two tubular adenomas were excised colonoscopically on 10/4/2013. His most recent colonoscopy was performed on 9/12/2018, and it was remarkable for moderate pancolonic diverticulosis and three diminutive polyps that were removed. One polyp was hyperplastic, and the other two proved to have been tubular adenomas. History:  Past Medical History:   Diagnosis Date    Coronary artery disease     59% LAD stenosis    COVID-19 vaccine series completed     Three Pfizer doses received    Diverticulosis of colon     Gout     History of colonic polyps     Hyperlipidemia     Hypertension        Past Surgical History:   Procedure Laterality Date    CARDIAC CATHETERIZATION  05/2023    COLONOSCOPY  10/24/2013    Dr. Raquel Ware  10/24/2008    Dr. Raquel Ware  09/12/2018    Dr. Lindalou Hamman, MD    TONSILLECTOMY      WISDOM TOOTH EXTRACTION         No family history on file.   Social History     Socioeconomic History    Marital status: Single     Spouse name: Not on file    Number of children: Not on file    Years of education: Not on file    Highest education level: Not on file   Occupational History    Not on file   Tobacco Use    Smoking status: Not on file    Smokeless tobacco: Not on file   Substance and Sexual Activity    Alcohol use: Not on file    Drug use: Not on file    Sexual activity: Not on file   Other Topics Concern    Not on file   Social History Narrative    Not on file     Social Determinants of Health     Financial Resource Strain: Not on file   Food Insecurity: Not on file   Transportation

## 2023-07-12 NOTE — ANESTHESIA POSTPROCEDURE EVALUATION
Department of Anesthesiology  Postprocedure Note    Patient: Jaylene Valencia  MRN: [id number]  YOB: 1960  Date of evaluation: 7/12/2023      Procedure Summary     Date: 07/12/23 Room / Location: [facility] ENDO 03 / [facility] ENDOSCOPY    Anesthesia Start: 1017 Anesthesia Stop: 1048    Procedure: COLONOSCOPY Diagnosis:       Special screening for malignant neoplasms, colon      Personal history of colonic polyps      (Special screening for malignant neoplasms, colon [Z12.11])      (Personal history of colonic polyps [Z86.010])    Surgeons: Cecilia Santamaria MD Responsible Provider: Wallace Coon MD    Anesthesia Type: MAC ASA Status: 2          Anesthesia Type: MAC    Angel Phase I: Angel Score: 10    Angel Phase II: Angel Score: 10      Anesthesia Post Evaluation    Patient location during evaluation: PACU  Patient participation: complete - patient cannot participate  Level of consciousness: awake  Pain score: 0  Airway patency: patent  Nausea & Vomiting: no nausea and no vomiting  Complications: no  Cardiovascular status: hemodynamically stable  Respiratory status: acceptable  Hydration status: stable

## 2023-07-12 NOTE — PROGRESS NOTES

## 2023-07-12 NOTE — DISCHARGE INSTRUCTIONS
Isreal Steel, [address] [place], [address]  [phone]      Post-Colonoscopy Instructions    Do not drive, operate dangerous machinery, sign legal documents, or conduct any important business for the rest of the day. Avoid strenuous exercise for the next 10 days. Avoid straining when you move your bowels. Do not take any aspirin, aspirin-containing products, ibuprofen (Advil, Motrin, etc.), or Aleve for the next two weeks. You may take Tylenol as directed on the bottle if needed. You may begin with a light diet today then advance to your usual diet as tolerated. Do not drink alcohol for the rest of the day. If you notice blood in your stool for more than one or two bowel movements following the procedure, if you develop abdominal pain (aside from gas cramps on the day of the procedure), or if you develop a fever with a temperature of 101.0 or higher, call my office. If you do not have a bowel movement within the next two days, call my office. If you have any other problems or questions, please call my office. Findings and Follow-up:  There was no cancer. There were two small, benign-appearing polyps that we removed, and the diverticulosis (not diverticulitis) was of course seen. You should consume a high fiber diet. I will discuss the pathology results with you when they are available, and I will make a follow-up recommendation at that time. Please call my office if you have not heard from me by Monday 7/17/2023. Colon Polyps: Care Instructions  Your Care Instructions     Colon polyps are growths in the colon or the rectum. The cause of most colon polyps is not known, and most people who get them do not have any problems. But a certain kind can turn into cancer. For this reason, regular testing for colon polyps is important for people as they get older. It is also important for anyone who has an increased risk for colon cancer.   Polyps are

## 2023-07-12 NOTE — OP NOTE
Zoraida Luque  [id number]  1960    Colonoscopy Operative Report    Procedure Type:   Colonoscopy with hot forceps polypectomies     Pre-operative Diagnosis:  Need for colorectal cancer screening/post-polypectomy surveillance    Post-operative Diagnosis:  Colonic polyps. Diverticulosis. Surgeon:  Rukhsana Guevara MD    Assistant:  Circulator Assist: Anjelica Moody RN    Referring Provider: Amarjit John MD    Sedation and Analgesia:  MAC anesthesia Propofol (300 mg)    Specimens Removed:  Polyps (2)    EBL:  0 mL. Complications: None apparent    Implants:  None    Indication For Procedure: The patient is an asymptomatic 78-year-old male with a history of colonic polyps. One adenomatous polyp was excised colonoscopically on 10/24/2008, and two tubular adenomas were excised colonoscopically on 10/4/2013. His most recent colonoscopy was performed on 9/12/2018, and it was remarkable for moderate pancolonic diverticulosis and three diminutive polyps that were removed. One polyp was hyperplastic, and the other two proved to have been tubular adenomas. Another colonoscopy is now indicated for colorectal cancer screening/post-polypectomy surveillance. Findings: There was one diminutive polyp located in the region of the splenic flexure and one in the rectosigmoid. There was moderate pancolonic diverticulosis. Procedure Details:  After informed consent was obtained, the patient was taken to the endoscopy suite where standard monitoring devices were attached and intravenous access was established. Sedation was administered by the anesthetist as needed throughout the procedure. The patient was placed in the left lateral decubitus position, and inspection of the perineum revealed no significant external lesions. Digital rectal examination revealed no masses. The Olympus videocolonoscope was lubricated and inserted transanally into the rectum.  It was advanced into the colon and without difficulty

## (undated) DEVICE — KIT IV STRT W CHLORAPREP PD 1ML

## (undated) DEVICE — Z DISCONTINUED USE 2751540 TUBING IRRIG L10IN DISP PMP ENDOGATOR

## (undated) DEVICE — FORCEPS BX L L240CM DIA2.4MM RAD JAW 4 HOT FOR POLYP DISP

## (undated) DEVICE — BAG BELONG PT PERS CLEAR HANDL

## (undated) DEVICE — TRAP SURG QUAD PARABOLA SLOT DSGN SFTY SCRN TRAPEASE

## (undated) DEVICE — AIRLIFE™ U/CONNECT-IT OXYGEN TUBING 7 FEET (2.1 M) CRUSH-RESISTANT OXYGEN TUBING, VINYL TIPPED: Brand: AIRLIFE™

## (undated) DEVICE — CONNECTOR TBNG AUX H2O JET DISP FOR OLY 160/180 SER

## (undated) DEVICE — CONTAINER SPEC 20 ML LID NEUT BUFF FORMALIN 10 % POLYPR STS

## (undated) DEVICE — SOLIDIFIER FLUID 3000 CC ABSORB

## (undated) DEVICE — NEEDLE HYPO 18GA L1.5IN PNK S STL HUB POLYPR SHLD REG BVL

## (undated) DEVICE — QUILTED PREMIUM COMFORT UNDERPAD,EXTRA HEAVY: Brand: WINGS

## (undated) DEVICE — 1200 GUARD II KIT W/5MM TUBE W/O VAC TUBE: Brand: GUARDIAN

## (undated) DEVICE — BW-412T DISP COMBO CLEANING BRUSH: Brand: SINGLE USE COMBINATION CLEANING BRUSH

## (undated) DEVICE — SYR 50ML SLIP TIP NSAF LF STRL --

## (undated) DEVICE — REM POLYHESIVE ADULT PATIENT RETURN ELECTRODE: Brand: VALLEYLAB

## (undated) DEVICE — Z CONVERTED USE 2274299 CUFF BLD PRESSURE LNG MED AD 25-35 CM ARM FLEXIPORT DISP

## (undated) DEVICE — KENDALL RADIOLUCENT FOAM MONITORING ELECTRODE -RECTANGULAR SHAPE: Brand: KENDALL

## (undated) DEVICE — SET GRAV CK VLV NEEDLESS ST 3 GANGED 4WAY STPCOCK HI FLO 10

## (undated) DEVICE — NEONATAL-ADULT SPO2 SENSOR: Brand: NELLCOR

## (undated) DEVICE — SNARE ENDOSCP M L240CM W27MM SHTH DIA2.4MM CHN 2.8MM OVL

## (undated) DEVICE — FCPS BX HOT RJ4 2.2MMX240CM -- RADIAL JAW 4 BX/40

## (undated) DEVICE — SET ADMIN 16ML TBNG L100IN 2 Y INJ SITE IV PIGGY BK DISP

## (undated) DEVICE — ENDO CARRY-ON PROCEDURE KIT INCLUDES ENZYMATIC SPONGE, GAUZE, BIOHAZARD LABEL, TRAY, LUBRICANT, DIRTY SCOPE LABEL, WATER LABEL, TRAY, DRAWSTRING PAD, AND DEFENDO 4-PIECE KIT.: Brand: ENDO CARRY-ON PROCEDURE KIT

## (undated) DEVICE — ELECTRODE PT RET AD L9FT HI MOIST COND ADH HYDRGEL CORDED

## (undated) DEVICE — Z DISCONTINUED NO SUB IDED SET EXTN W/ 4 W STPCOCK M SPIN LOK 36IN

## (undated) DEVICE — BAG SPEC BIOHZD LF 2MIL 6X10IN -- CONVERT TO ITEM 357326

## (undated) DEVICE — CANN NASAL O2 CAPNOGRAPHY AD -- FILTERLINE

## (undated) DEVICE — CATH IV AUTOGRD BC BLU 22GA 25 -- INSYTE

## (undated) DEVICE — SYRINGE MED 20ML STD CLR PLAS LUERLOCK TIP N CTRL DISP